# Patient Record
Sex: MALE | Race: WHITE | NOT HISPANIC OR LATINO | Employment: FULL TIME | URBAN - METROPOLITAN AREA
[De-identification: names, ages, dates, MRNs, and addresses within clinical notes are randomized per-mention and may not be internally consistent; named-entity substitution may affect disease eponyms.]

---

## 2023-04-19 ENCOUNTER — OFFICE VISIT (OUTPATIENT)
Dept: PRIMARY CARE | Facility: CLINIC | Age: 58
End: 2023-04-19
Payer: COMMERCIAL

## 2023-04-19 VITALS
HEART RATE: 66 BPM | WEIGHT: 213.8 LBS | BODY MASS INDEX: 30.61 KG/M2 | SYSTOLIC BLOOD PRESSURE: 140 MMHG | HEIGHT: 70 IN | DIASTOLIC BLOOD PRESSURE: 86 MMHG

## 2023-04-19 DIAGNOSIS — E78.00 ELEVATED LDL CHOLESTEROL LEVEL: ICD-10-CM

## 2023-04-19 DIAGNOSIS — Z12.11 COLON CANCER SCREENING: ICD-10-CM

## 2023-04-19 DIAGNOSIS — R73.03 PREDIABETES: ICD-10-CM

## 2023-04-19 DIAGNOSIS — R19.5 POSITIVE COLORECTAL CANCER SCREENING USING COLOGUARD TEST: ICD-10-CM

## 2023-04-19 DIAGNOSIS — R91.1 LUNG NODULE, SOLITARY: ICD-10-CM

## 2023-04-19 DIAGNOSIS — I10 PRIMARY HYPERTENSION: Primary | ICD-10-CM

## 2023-04-19 PROBLEM — M48.02 NEURAL FORAMINAL STENOSIS OF CERVICAL SPINE: Status: ACTIVE | Noted: 2023-04-19

## 2023-04-19 PROCEDURE — 99214 OFFICE O/P EST MOD 30 MIN: CPT | Performed by: STUDENT IN AN ORGANIZED HEALTH CARE EDUCATION/TRAINING PROGRAM

## 2023-04-19 PROCEDURE — 1036F TOBACCO NON-USER: CPT | Performed by: STUDENT IN AN ORGANIZED HEALTH CARE EDUCATION/TRAINING PROGRAM

## 2023-04-19 PROCEDURE — 3077F SYST BP >= 140 MM HG: CPT | Performed by: STUDENT IN AN ORGANIZED HEALTH CARE EDUCATION/TRAINING PROGRAM

## 2023-04-19 PROCEDURE — 3079F DIAST BP 80-89 MM HG: CPT | Performed by: STUDENT IN AN ORGANIZED HEALTH CARE EDUCATION/TRAINING PROGRAM

## 2023-04-19 RX ORDER — OLMESARTAN MEDOXOMIL 20 MG/1
20 TABLET ORAL DAILY
COMMUNITY
End: 2023-04-19 | Stop reason: ALTCHOICE

## 2023-04-19 RX ORDER — OLMESARTAN MEDOXOMIL 40 MG/1
40 TABLET ORAL DAILY
Qty: 90 TABLET | Refills: 1 | Status: SHIPPED | OUTPATIENT
Start: 2023-04-19 | End: 2023-11-14 | Stop reason: SDUPTHER

## 2023-04-19 ASSESSMENT — PATIENT HEALTH QUESTIONNAIRE - PHQ9
2. FEELING DOWN, DEPRESSED OR HOPELESS: NOT AT ALL
1. LITTLE INTEREST OR PLEASURE IN DOING THINGS: NOT AT ALL
SUM OF ALL RESPONSES TO PHQ9 QUESTIONS 1 AND 2: 0

## 2023-04-19 NOTE — PROGRESS NOTES
Subjective   Patient ID: Chuy Oconnell is a 57 y.o. male who presents for Hyperlipidemia and Hypertension.  Today he is accompanied by alone.     HPI  1. Hypertension  Currently on olmesartan 20 mg daily. Tolerating well.   BP today in triage showed 140/84. Recheck showed 144/82.  Denies any headaches, dizziness, vision changes, chest pain or pressure, palpitations, SOB, MENDOZA, LE edema, or any other complaints.  Willing to change medication if medically necessary  Continues to work out 6 times weekly and surprise of these values     2. Hyperlipidemia   Currently trying to eat a well balanced diet and exercises daily  Previous lipid panel on 10/24/22 showed HDL 38.5, VLDL 66 and   CT cardiac score was 0   Did admit that he has been taking over-the-counter fish oil  Agreeable to do blood work in the future and even wondered if it could be appropriate to do it during his physical    3. Lung nodule on CT  A follow up CT is due some time this spring  Patient claims he is going to schedule it after he comes back from a work trip at the end of May  Denies any issues at this time, and he feels that it has been a waste of time    4. Prediabetes  Last A1c was stable at 6.0 on 10/24/22  Denies any polyuria, polydipsia, polyphagia, vision changes or neuropathy      5. Health Maintenance/Positive colorectal screening via Cologuard  Patient is in need of a shingles vaccine   Patient was given a requisition for a colonoscopy last year after a positive cologuard   He was unable to have the procedure completed due to trouble with scheduling  Willing to schedule it for the near future, but feels they will simply deny him again         Current Outpatient Medications on File Prior to Visit   Medication Sig Dispense Refill    [DISCONTINUED] olmesartan (BENIcar) 20 mg tablet Take 1 tablet (20 mg) by mouth once daily.       No current facility-administered medications on file prior to visit.        No Known  "Allergies    Immunization History   Administered Date(s) Administered    Moderna SARS-CoV-2 Vaccination 12/06/2021, 04/21/2022    Pfizer Purple Cap SARS-CoV-2 04/05/2021, 04/26/2021         Review of Systems  All pertinent positive symptoms are included in the history of present illness.  All other systems have been reviewed and are negative and noncontributory to this patient's current ailments.     Objective   /86 (BP Location: Left arm, Patient Position: Sitting, BP Cuff Size: Adult)   Pulse 66   Ht 1.778 m (5' 10\")   Wt 97 kg (213 lb 12.8 oz)   BMI 30.68 kg/m²   BSA: 2.19 meters squared      Physical Exam  CONSTITUTIONAL - well nourished, well developed, looks like stated age, in no acute distress, not ill-appearing, and not tired appearing  SKIN - normal skin color and pigmentation, normal skin turgor without rash, lesions, or nodules visualized  HEAD - no trauma, normocephalic  EYES - pupils are equal and reactive to light, extraocular muscles are intact, and normal external exam  ENT - uvula midline, normal tongue movement and throat normal, no exudate, nasal passage without discharge and patent  NECK - supple without rigidity, no neck mass was observed, no thyromegaly or thyroid nodules  CHEST - clear to auscultation, no wheezing, no crackles and no rales, good effort  CARDIAC - regular rate and regular rhythm, no skipped beats, no murmur  ABDOMEN - no organomegaly, soft, nontender, nondistended, normal bowel sounds, no guarding/rebound/rigidity  EXTREMITIES - no edema, no deformities  NEUROLOGICAL - normal gait, normal balance, normal motor, no ataxia; alert, oriented and no focal signs  PSYCHIATRIC - alert, pleasant and cordial, age-appropriate  IMMUNOLOGIC - no cervical lymphadenopathy     Assessment/Plan   1. Hypertension  Currently on olmesartan 20 mg daily. Tolerating well.   Unfortunate blood pressure was elevated so we will increase now to 40 mg daily  This was sent to your local " pharmacy  I would like to have you monitor and record blood pressures at home  Blood pressure goal should be below 130/80, ideally 120/80     2. Hyperlipidemia   Previous lipid panel on 10/24/22 showed HDL 38.5, VLDL 66 and   CT cardiac score was 0   Continue taking over-the-counter fish oil  We will repeat blood work in 6 months    3. Lung nodule on CT  Continue to follow with his pulmonary clinic  Denies any issues at this time, and he feels that it has been a waste of time  The protocol suggests a follow-up CT chest in this spring  Please have this done and we will continue monitoring closely and I will take over in the future    4. Prediabetes  Last A1c was stable at 6.0 on 10/24/22  We will continue monitoring closely and please exercise regularly     5. Health Maintenance/Positive colorectal screening via Cologuard  We will discuss shingles vaccine in the fall  Otherwise, I will provide a colon cancer screening requisition via colonoscopy for [facility]    Please follow-up in 6 months for continued care

## 2023-06-20 ENCOUNTER — HOSPITAL ENCOUNTER (OUTPATIENT)
Dept: DATA CONVERSION | Facility: HOSPITAL | Age: 58
End: 2023-06-20
Attending: INTERNAL MEDICINE
Payer: COMMERCIAL

## 2023-06-20 DIAGNOSIS — I10 ESSENTIAL (PRIMARY) HYPERTENSION: ICD-10-CM

## 2023-06-20 DIAGNOSIS — D12.0 BENIGN NEOPLASM OF CECUM: ICD-10-CM

## 2023-06-20 DIAGNOSIS — Z12.11 ENCOUNTER FOR SCREENING FOR MALIGNANT NEOPLASM OF COLON: ICD-10-CM

## 2023-06-20 DIAGNOSIS — D12.4 BENIGN NEOPLASM OF DESCENDING COLON: ICD-10-CM

## 2023-06-27 LAB
COMPLETE PATHOLOGY REPORT: NORMAL
CONVERTED CLINICAL DIAGNOSIS-HISTORY: NORMAL
CONVERTED FINAL DIAGNOSIS: NORMAL
CONVERTED FINAL REPORT PDF LINK TO COPY AND PASTE: NORMAL
CONVERTED GROSS DESCRIPTION: NORMAL

## 2023-11-13 ASSESSMENT — ENCOUNTER SYMPTOMS
NECK PAIN: 0
BLURRED VISION: 0
HYPERTENSION: 1
SWEATS: 0
ORTHOPNEA: 0
PALPITATIONS: 0
HEADACHES: 0
SHORTNESS OF BREATH: 0
PND: 0

## 2023-11-14 ENCOUNTER — OFFICE VISIT (OUTPATIENT)
Dept: PRIMARY CARE | Facility: CLINIC | Age: 58
End: 2023-11-14
Payer: COMMERCIAL

## 2023-11-14 VITALS
BODY MASS INDEX: 29.98 KG/M2 | HEART RATE: 88 BPM | HEIGHT: 70 IN | SYSTOLIC BLOOD PRESSURE: 130 MMHG | DIASTOLIC BLOOD PRESSURE: 80 MMHG | WEIGHT: 209.4 LBS

## 2023-11-14 DIAGNOSIS — E78.00 ELEVATED LDL CHOLESTEROL LEVEL: ICD-10-CM

## 2023-11-14 DIAGNOSIS — R91.1 LUNG NODULE, SOLITARY: ICD-10-CM

## 2023-11-14 DIAGNOSIS — Z00.00 HEALTHCARE MAINTENANCE: Primary | ICD-10-CM

## 2023-11-14 DIAGNOSIS — R73.03 PREDIABETES: ICD-10-CM

## 2023-11-14 DIAGNOSIS — I10 PRIMARY HYPERTENSION: ICD-10-CM

## 2023-11-14 PROCEDURE — 3075F SYST BP GE 130 - 139MM HG: CPT | Performed by: STUDENT IN AN ORGANIZED HEALTH CARE EDUCATION/TRAINING PROGRAM

## 2023-11-14 PROCEDURE — 1036F TOBACCO NON-USER: CPT | Performed by: STUDENT IN AN ORGANIZED HEALTH CARE EDUCATION/TRAINING PROGRAM

## 2023-11-14 PROCEDURE — 3079F DIAST BP 80-89 MM HG: CPT | Performed by: STUDENT IN AN ORGANIZED HEALTH CARE EDUCATION/TRAINING PROGRAM

## 2023-11-14 PROCEDURE — 99213 OFFICE O/P EST LOW 20 MIN: CPT | Performed by: STUDENT IN AN ORGANIZED HEALTH CARE EDUCATION/TRAINING PROGRAM

## 2023-11-14 PROCEDURE — 99396 PREV VISIT EST AGE 40-64: CPT | Performed by: STUDENT IN AN ORGANIZED HEALTH CARE EDUCATION/TRAINING PROGRAM

## 2023-11-14 RX ORDER — OLMESARTAN MEDOXOMIL 40 MG/1
40 TABLET ORAL DAILY
Qty: 90 TABLET | Refills: 1 | Status: SHIPPED | OUTPATIENT
Start: 2023-11-14 | End: 2024-05-16

## 2023-11-14 ASSESSMENT — ENCOUNTER SYMPTOMS
HYPERTENSION: 1
BLURRED VISION: 0
PALPITATIONS: 0
HEADACHES: 0
SWEATS: 0
PND: 0
SHORTNESS OF BREATH: 0
ORTHOPNEA: 0
NECK PAIN: 0

## 2023-11-14 ASSESSMENT — PATIENT HEALTH QUESTIONNAIRE - PHQ9
1. LITTLE INTEREST OR PLEASURE IN DOING THINGS: NOT AT ALL
SUM OF ALL RESPONSES TO PHQ9 QUESTIONS 1 AND 2: 0
2. FEELING DOWN, DEPRESSED OR HOPELESS: NOT AT ALL

## 2023-11-14 NOTE — PROGRESS NOTES
"Subjective   Patient ID: Chuy Oconnell \"Florentin\" is a 58 y.o. male who presents for Annual Exam.  Today he is accompanied by alone.     Hypertension  The current episode started more than 1 year ago. The problem has been rapidly improving since onset. The problem is controlled. Pertinent negatives include no anxiety, blurred vision, chest pain, headaches, malaise/fatigue, neck pain, orthopnea, palpitations, peripheral edema, PND, shortness of breath or sweats. There are no associated agents to hypertension. There are no known risk factors for coronary artery disease. There are no compliance problems.      1. Health Maintenance  Overall patient is doing well.   Immunization: Tdap up-to-date 2021, inquiring about influenza and shingles but declines at this time  COVID-19 vaccine series completed (Pfizer).  Colon Cancer Screening: Positive Cologuard and performed colonoscopy this summer, 2023, with a 5-year clearance, due 2028  Diet: patient is on a specific diet plan and continues to do well  Exercise: 5-6 days a week. Involves weights, cycling and rowing machine.   Tobacco: Denies use  EtOH: 1-2 glasses of red wine a day    2. Hypertension  Currently on olmesartan 40 mg daily. Tolerating well.   Feels well timely major issues/complaints  Blood pressure well within normal limits but did note some low values and even decreased down to 20 mg  Currently back up to 40 mg  Denies any cardiopulmonary symptoms requesting refills     3. Hyperlipidemia   Currently trying to eat a well balanced diet and exercises daily  Previous lipid panel on 10/24/22 showed HDL 38.5, VLDL 66 and   CT cardiac score was 0   Agreeable to do lab work in the near future    4. Lung nodule on CT  A follow up CT was due some time this spring  States that he will debate to do this again and wish to discuss this briefly     5. Prediabetes  Last A1c was stable at 6.0 on 10/24/22  Denies any polyuria, polydipsia, polyphagia, vision changes or " "neuropathy       Current Outpatient Medications on File Prior to Visit   Medication Sig Dispense Refill    olmesartan (BENIcar) 40 mg tablet Take 1 tablet (40 mg) by mouth once daily. 90 tablet 1     No current facility-administered medications on file prior to visit.        No Known Allergies    Immunization History   Administered Date(s) Administered    Moderna SARS-CoV-2 Vaccination 12/06/2021, 04/21/2022    Pfizer Purple Cap SARS-CoV-2 04/05/2021, 04/26/2021    Tdap vaccine, age 7 year and older (BOOSTRIX) 09/24/2021         Review of Systems   Constitutional:  Negative for malaise/fatigue.   Eyes:  Negative for blurred vision.   Respiratory:  Negative for shortness of breath.    Cardiovascular:  Negative for chest pain, palpitations, orthopnea and PND.   Musculoskeletal:  Negative for neck pain.   Neurological:  Negative for headaches.     All pertinent positive symptoms are included in the history of present illness.  All other systems have been reviewed and are negative and noncontributory to this patient's current ailments.     Objective   /80 (BP Location: Left arm, Patient Position: Sitting, BP Cuff Size: Adult)   Pulse 88   Ht 1.778 m (5' 10\")   Wt 95 kg (209 lb 6.4 oz)   BMI 30.05 kg/m²   BSA: 2.17 meters squared  No visits with results within 1 Month(s) from this visit.   Latest known visit with results is:   Legacy Encounter on 10/24/2022   Component Date Value Ref Range Status    Glucose 10/24/2022 94  74 - 99 mg/dL Final    Sodium 10/24/2022 136  136 - 145 mmol/L Final    Potassium 10/24/2022 4.2  3.5 - 5.3 mmol/L Final    Chloride 10/24/2022 101  98 - 107 mmol/L Final    Bicarbonate 10/24/2022 24  21 - 32 mmol/L Final    Anion Gap 10/24/2022 15  10 - 20 mmol/L Final    Urea Nitrogen 10/24/2022 18  6 - 23 mg/dL Final    Creatinine 10/24/2022 1.01  0.50 - 1.30 mg/dL Final    GFR MALE 10/24/2022 86  >90 mL/min/1.73m2 Final    Comment:  CALCULATIONS OF ESTIMATED GFR ARE PERFORMED   USING THE " 2021 CKD-EPI STUDY REFIT EQUATION   WITHOUT THE RACE VARIABLE FOR THE IDMS-TRACEABLE   CREATININE METHODS.    https://jasn.asnjournals.org/content/early/2021/09/22/ASN.6429217380      Calcium 10/24/2022 8.6  8.6 - 10.3 mg/dL Final    Albumin 10/24/2022 4.2  3.4 - 5.0 g/dL Final    Alkaline Phosphatase 10/24/2022 50  33 - 120 U/L Final    Total Protein 10/24/2022 6.9  6.4 - 8.2 g/dL Final    AST 10/24/2022 25  9 - 39 U/L Final    Total Bilirubin 10/24/2022 0.6  0.0 - 1.2 mg/dL Final    ALT (SGPT) 10/24/2022 25  10 - 52 U/L Final    Comment:  Patients treated with Sulfasalazine may generate    falsely decreased results for ALT.      Hepatitis C Ab 10/24/2022 NONREACTIVE  NONREACTIVE Final    Comment:  Results from patients taking biotin supplements or receiving   high-dose biotin therapy should be interpreted with caution   due to possible interference with this test. Providers may    contact their local laboratory for further information.      TSH 10/24/2022 2.31  0.44 - 3.98 mIU/L Final    Comment:  TSH testing is performed using different testing    methodology at [facility] than at other    [facility]. Direct result comparisons should    only be made within the same method.      Hemoglobin A1C 10/24/2022 6.0 (A)  % Final    Comment:      Diagnosis of Diabetes-Adults   Non-Diabetic: < or = 5.6%   Increased risk for developing diabetes: 5.7-6.4%   Diagnostic of diabetes: > or = 6.5%  .       Monitoring of Diabetes                Age (y)     Therapeutic Goal (%)   Adults:          >18           <7.0   Pediatrics:    13-18           <7.5                   7-12           <8.0                   0- 6            7.5-8.5   American Diabetes Association. Diabetes Care 33(S1), Jan 2010.      Estimated Average Glucose 10/24/2022 126  MG/DL Final    PSA 10/24/2022 0.58  0.00 - 4.00 ng/mL Final    Comment: The FDA requires that the method used for PSA assay be   reported to the physician. Values obtained  with different   assay methods must not be used interchangeably. This test  was performed at [facility] using the Access   Hybritech PSA assay is a two-site immunoenzymatic sandwich   assay. The assay is approved for measurement of   prostate-specific antigen (PSA)in serum and may be used   in conjunction with a digital rectal examination in men   50 years and older as an aid in detection of prostate   cancer.  5-Alpha-reductase inhibitors (e.g. Proscar, Finasteride,   Avodart, Dutasteride and Shima) for the treatment of BPH   have been shown to lower PSA levels by an average of 50%   after 6 months of treatment.      Cholesterol 10/24/2022 175  0 - 199 mg/dL Final    Comment: .      AGE      DESIRABLE   BORDERLINE HIGH   HIGH     0-19 Y     0 - 169       170 - 199     >/= 200    20-24 Y     0 - 189       190 - 224     >/= 225         >24 Y     0 - 199       200 - 239     >/= 240   **All ranges are based on fasting samples. Specific   therapeutic targets will vary based on patient-specific   cardiac risk.  .   Pediatric guidelines reference:Pediatrics 2011, 128(S5).   Adult guidelines reference: NCEP ATPIII Guidelines,     PATTY 2001, 258:2486-97  .   Venipuncture immediately after or during the    administration of Metamizole may lead to falsely   low results. Testing should be performed immediately   prior to Metamizole dosing.      HDL 10/24/2022 38.5 (A)  mg/dL Final    Comment: .      AGE      VERY LOW   LOW     NORMAL    HIGH       0-19 Y       < 35   < 40     40-45     ----    20-24 Y       ----   < 40       >45     ----      >24 Y       ----   < 40     40-60      >60  .      Cholesterol/HDL Ratio 10/24/2022 4.5   Final    Comment: REF VALUES  DESIRABLE  < 3.4  HIGH RISK  > 5.0      LDL 10/24/2022 71  0 - 99 mg/dL Final    Comment: .                           NEAR      BORD      AGE      DESIRABLE  OPTIMAL    HIGH     HIGH     VERY HIGH     0-19 Y     0 - 109     ---    110-129   >/= 130      ----    20-24 Y     0 - 119     ---    120-159   >/= 160     ----      >24 Y     0 -  99   100-129  130-159   160-189     >/=190  .      VLDL 10/24/2022 66 (H)  0 - 40 mg/dL Final    Triglycerides 10/24/2022 329 (H)  0 - 149 mg/dL Final    Comment: .      AGE      DESIRABLE   BORDERLINE HIGH   HIGH     VERY HIGH   0 D-90 D    19 - 174         ----         ----        ----  91 D- 9 Y     0 -  74        75 -  99     >/= 100      ----    10-19 Y     0 -  89        90 - 129     >/= 130      ----    20-24 Y     0 - 114       115 - 149     >/= 150      ----         >24 Y     0 - 149       150 - 199    200- 499    >/= 500  .   Venipuncture immediately after or during the    administration of Metamizole may lead to falsely   low results. Testing should be performed immediately   prior to Metamizole dosing.      Non HDL Cholesterol 10/24/2022 137  mg/dL Final    Comment:     AGE      DESIRABLE   BORDERLINE HIGH   HIGH     VERY HIGH     0-19 Y     0 - 119       120 - 144     >/= 145    >/= 160    20-24 Y     0 - 149       150 - 189     >/= 190      ----         >24 Y    30 MG/DL ABOVE LDL CHOLESTEROL GOAL  .      WBC 10/24/2022 7.5  4.4 - 11.3 x10E9/L Final    RBC 10/24/2022 5.12  4.50 - 5.90 x10E12/L Final    Hemoglobin 10/24/2022 14.9  13.5 - 17.5 g/dL Final    Hematocrit 10/24/2022 45.7  41.0 - 52.0 % Final    MCV 10/24/2022 89  80 - 100 fL Final    MCHC 10/24/2022 32.6  32.0 - 36.0 g/dL Final    Platelets 10/24/2022 293  150 - 450 x10E9/L Final    RDW 10/24/2022 13.2  11.5 - 14.5 % Final    Neutrophils % 10/24/2022 47.8  40.0 - 80.0 % Final    Immature Granulocytes %, Automated 10/24/2022 0.3  0.0 - 0.9 % Final    Comment:  Immature Granulocyte Count (IG) includes promyelocytes,    myelocytes and metamyelocytes but does not include bands.   Percent differential counts (%) should be interpreted in the   context of the absolute cell counts (cells/L).      Lymphocytes % 10/24/2022 41.4  13.0 - 44.0 % Final    Monocytes %  10/24/2022 8.0  2.0 - 10.0 % Final    Eosinophils % 10/24/2022 2.1  0.0 - 6.0 % Final    Basophils % 10/24/2022 0.4  0.0 - 2.0 % Final    Neutrophils Absolute 10/24/2022 3.58  1.20 - 7.70 x10E9/L Final    Lymphocytes Absolute 10/24/2022 3.10  1.20 - 4.80 x10E9/L Final    Monocytes Absolute 10/24/2022 0.60  0.10 - 1.00 x10E9/L Final    Eosinophils Absolute 10/24/2022 0.16  0.00 - 0.70 x10E9/L Final    Basophils Absolute 10/24/2022 0.03  0.00 - 0.10 x10E9/L Final       Physical Exam  CONSTITUTIONAL - well nourished, well developed, looks like stated age, in no acute distress, not ill-appearing, and not tired appearing  SKIN - normal skin color and pigmentation, normal skin turgor without rash, lesions, or nodules visualized  HEAD - no trauma, normocephalic  EYES - normal external exam  ENT - TM's intact, no injection, no signs of infection, uvula midline, normal tongue movement and throat normal, no exudate  NECK - supple without rigidity, no neck mass was observed, no thyromegaly or thyroid nodules  CHEST - clear to auscultation, no wheezing, no crackles and no rales, good effort  CARDIAC - regular rate and regular rhythm, no skipped beats, no murmur  ABDOMEN - no organomegaly, soft, nontender, nondistended, normal bowel sounds, no guarding/rebound/rigidity, negative McBurney sign and negative Estrada sign  EXTREMITIES - no edema, no deformities  NEUROLOGICAL - normal gait, normal balance, normal motor, no ataxia  PSYCHIATRIC - alert, pleasant and cordial, age-appropriate  IMMUNOLOGIC - no cervical lymphadenopathy     Assessment/Plan   1.  Healthcare maintenance  Complete history and physical examination was performed  We will notify of test results once available and make treatment recommendations accordingly  Colon cancer screening due 2028    2. Hypertension  Stable.  No changes recommended at this time  We will continue olmesartan 40 mg daily  I would like to have you monitor and record blood pressures at  home  Blood pressure goal should be below 130/80, ideally 120/80     3. Hyperlipidemia   Previous lipid panel on 10/24/22 showed HDL 38.5, VLDL 66 and   CT cardiac score was 0   Continue taking over-the-counter fish oil  Lab work ordered and will notify the results and make reactors accordingly     4. Lung nodule on CT  Please repeat the CT chest and ultimately I will continue following up after this time     5. Prediabetes  Last A1c was stable at 6.0 on 10/24/22  We will continue monitoring closely and please exercise regularly

## 2023-11-16 ENCOUNTER — LAB (OUTPATIENT)
Dept: LAB | Facility: LAB | Age: 58
End: 2023-11-16
Payer: COMMERCIAL

## 2023-11-16 DIAGNOSIS — Z00.00 HEALTHCARE MAINTENANCE: ICD-10-CM

## 2023-11-16 LAB
ALBUMIN SERPL BCP-MCNC: 4.3 G/DL (ref 3.4–5)
ALP SERPL-CCNC: 45 U/L (ref 33–120)
ALT SERPL W P-5'-P-CCNC: 25 U/L (ref 10–52)
ANION GAP SERPL CALC-SCNC: 11 MMOL/L (ref 10–20)
AST SERPL W P-5'-P-CCNC: 21 U/L (ref 9–39)
BASOPHILS # BLD AUTO: 0.03 X10*3/UL (ref 0–0.1)
BASOPHILS NFR BLD AUTO: 0.4 %
BILIRUB SERPL-MCNC: 0.5 MG/DL (ref 0–1.2)
BUN SERPL-MCNC: 22 MG/DL (ref 6–23)
CALCIUM SERPL-MCNC: 9.1 MG/DL (ref 8.6–10.3)
CHLORIDE SERPL-SCNC: 103 MMOL/L (ref 98–107)
CHOLEST SERPL-MCNC: 199 MG/DL (ref 0–199)
CHOLESTEROL/HDL RATIO: 3.9
CO2 SERPL-SCNC: 28 MMOL/L (ref 21–32)
CREAT SERPL-MCNC: 1.06 MG/DL (ref 0.5–1.3)
EOSINOPHIL # BLD AUTO: 0.11 X10*3/UL (ref 0–0.7)
EOSINOPHIL NFR BLD AUTO: 1.6 %
ERYTHROCYTE [DISTWIDTH] IN BLOOD BY AUTOMATED COUNT: 13.9 % (ref 11.5–14.5)
EST. AVERAGE GLUCOSE BLD GHB EST-MCNC: 128 MG/DL
GFR SERPL CREATININE-BSD FRML MDRD: 81 ML/MIN/1.73M*2
GLUCOSE SERPL-MCNC: 100 MG/DL (ref 74–99)
HBA1C MFR BLD: 6.1 %
HCT VFR BLD AUTO: 46.5 % (ref 41–52)
HDLC SERPL-MCNC: 50.4 MG/DL
HGB BLD-MCNC: 14.7 G/DL (ref 13.5–17.5)
IMM GRANULOCYTES # BLD AUTO: 0.01 X10*3/UL (ref 0–0.7)
IMM GRANULOCYTES NFR BLD AUTO: 0.1 % (ref 0–0.9)
LDLC SERPL CALC-MCNC: 134 MG/DL
LYMPHOCYTES # BLD AUTO: 2.73 X10*3/UL (ref 1.2–4.8)
LYMPHOCYTES NFR BLD AUTO: 40.3 %
MCH RBC QN AUTO: 28.7 PG (ref 26–34)
MCHC RBC AUTO-ENTMCNC: 31.6 G/DL (ref 32–36)
MCV RBC AUTO: 91 FL (ref 80–100)
MONOCYTES # BLD AUTO: 0.49 X10*3/UL (ref 0.1–1)
MONOCYTES NFR BLD AUTO: 7.2 %
NEUTROPHILS # BLD AUTO: 3.4 X10*3/UL (ref 1.2–7.7)
NEUTROPHILS NFR BLD AUTO: 50.4 %
NON HDL CHOLESTEROL: 149 MG/DL (ref 0–149)
NRBC BLD-RTO: 0 /100 WBCS (ref 0–0)
PLATELET # BLD AUTO: 295 X10*3/UL (ref 150–450)
POTASSIUM SERPL-SCNC: 4.5 MMOL/L (ref 3.5–5.3)
PROT SERPL-MCNC: 7 G/DL (ref 6.4–8.2)
PSA SERPL-MCNC: 0.73 NG/ML
RBC # BLD AUTO: 5.12 X10*6/UL (ref 4.5–5.9)
SODIUM SERPL-SCNC: 137 MMOL/L (ref 136–145)
TRIGL SERPL-MCNC: 73 MG/DL (ref 0–149)
TSH SERPL-ACNC: 0.94 MIU/L (ref 0.44–3.98)
VLDL: 15 MG/DL (ref 0–40)
WBC # BLD AUTO: 6.8 X10*3/UL (ref 4.4–11.3)

## 2023-11-16 PROCEDURE — 80053 COMPREHEN METABOLIC PANEL: CPT

## 2023-11-16 PROCEDURE — 80061 LIPID PANEL: CPT

## 2023-11-16 PROCEDURE — 85025 COMPLETE CBC W/AUTO DIFF WBC: CPT

## 2023-11-16 PROCEDURE — 84443 ASSAY THYROID STIM HORMONE: CPT

## 2023-11-16 PROCEDURE — 83036 HEMOGLOBIN GLYCOSYLATED A1C: CPT

## 2023-11-16 PROCEDURE — 36415 COLL VENOUS BLD VENIPUNCTURE: CPT

## 2023-11-16 PROCEDURE — 84153 ASSAY OF PSA TOTAL: CPT

## 2023-11-17 NOTE — RESULT ENCOUNTER NOTE
Hemoglobin A1c continues to show prediabetes at 6.1% slightly elevated from last year's value at 6.0%  I would recommend we continue monitoring closely    Cholesterol looks okay but did increase slightly to 199, HDL 50, , triglycerides 73    Sugar one-point above normal 1 fasting but otherwise liver, kidneys, electrolytes are all within normal limits    Complete blood cell count shows no anemia    Thyroid and prostate within normal limits

## 2024-05-16 DIAGNOSIS — I10 PRIMARY HYPERTENSION: ICD-10-CM

## 2024-05-16 RX ORDER — OLMESARTAN MEDOXOMIL 40 MG/1
40 TABLET ORAL DAILY
Qty: 30 TABLET | Refills: 0 | Status: SHIPPED | OUTPATIENT
Start: 2024-05-16

## 2024-05-23 ENCOUNTER — APPOINTMENT (OUTPATIENT)
Dept: PRIMARY CARE | Facility: CLINIC | Age: 59
End: 2024-05-23
Payer: COMMERCIAL

## 2024-06-16 DIAGNOSIS — I10 PRIMARY HYPERTENSION: ICD-10-CM

## 2024-06-17 RX ORDER — OLMESARTAN MEDOXOMIL 40 MG/1
40 TABLET ORAL DAILY
Qty: 30 TABLET | Refills: 0 | Status: SHIPPED | OUTPATIENT
Start: 2024-06-17

## 2024-06-28 ENCOUNTER — APPOINTMENT (OUTPATIENT)
Dept: PRIMARY CARE | Facility: CLINIC | Age: 59
End: 2024-06-28
Payer: COMMERCIAL

## 2024-06-28 VITALS
HEIGHT: 70 IN | HEART RATE: 70 BPM | SYSTOLIC BLOOD PRESSURE: 130 MMHG | BODY MASS INDEX: 29.92 KG/M2 | DIASTOLIC BLOOD PRESSURE: 80 MMHG | WEIGHT: 209 LBS

## 2024-06-28 DIAGNOSIS — R91.1 LUNG NODULE, SOLITARY: Primary | ICD-10-CM

## 2024-06-28 DIAGNOSIS — R73.03 PREDIABETES: ICD-10-CM

## 2024-06-28 DIAGNOSIS — I10 PRIMARY HYPERTENSION: ICD-10-CM

## 2024-06-28 DIAGNOSIS — E78.00 ELEVATED LDL CHOLESTEROL LEVEL: ICD-10-CM

## 2024-06-28 PROCEDURE — 1036F TOBACCO NON-USER: CPT | Performed by: STUDENT IN AN ORGANIZED HEALTH CARE EDUCATION/TRAINING PROGRAM

## 2024-06-28 PROCEDURE — 3079F DIAST BP 80-89 MM HG: CPT | Performed by: STUDENT IN AN ORGANIZED HEALTH CARE EDUCATION/TRAINING PROGRAM

## 2024-06-28 PROCEDURE — 3075F SYST BP GE 130 - 139MM HG: CPT | Performed by: STUDENT IN AN ORGANIZED HEALTH CARE EDUCATION/TRAINING PROGRAM

## 2024-06-28 PROCEDURE — 99214 OFFICE O/P EST MOD 30 MIN: CPT | Performed by: STUDENT IN AN ORGANIZED HEALTH CARE EDUCATION/TRAINING PROGRAM

## 2024-06-28 RX ORDER — OLMESARTAN MEDOXOMIL 40 MG/1
40 TABLET ORAL DAILY
Qty: 90 TABLET | Refills: 1 | Status: SHIPPED | OUTPATIENT
Start: 2024-06-28

## 2024-06-28 ASSESSMENT — ENCOUNTER SYMPTOMS
HYPERTENSION: 1
NECK PAIN: 0
HEADACHES: 0
PALPITATIONS: 0
SHORTNESS OF BREATH: 0

## 2024-06-28 NOTE — PROGRESS NOTES
"Subjective   Patient ID: Chuy Oconnell \"Lisandro" is a 58 y.o. male who presents for Hypertension.  Today he is accompanied by alone.       1.  Hypertension  Currently on olmesartan 40 mg daily.  Continues to feel well without any issues/complaints  In the past did note lower values and we decreased down to 20 mg but currently he is back up to 40 mg  Denies any cardiopulmonary symptoms requesting refills     2.  Hyperlipidemia   Currently trying to eat a well balanced diet and exercises daily  Previous lipid panel showed a total cholesterol 199, HDL 50, , triglycerides 73  CT cardiac score was 0   Continues to do blood work on a yearly basis    3.  Lung nodule on CT  A follow up CT was due last year, 2023  States that he will debate to do this again and wish to discuss this briefly     4.  Prediabetes  Last A1c was stable at 6.0 on 10/24/22 and then just last year was at 6.1% in November 2023  Denies any polyuria, polydipsia, polyphagia, vision changes or neuropathy       Current Outpatient Medications on File Prior to Visit   Medication Sig Dispense Refill    [DISCONTINUED] olmesartan (BENIcar) 40 mg tablet TAKE 1 TABLET BY MOUTH EVERY DAY 30 tablet 0     No current facility-administered medications on file prior to visit.        No Known Allergies    Immunization History   Administered Date(s) Administered    Moderna SARS-CoV-2 Vaccination 12/06/2021, 04/21/2022    Pfizer Purple Cap SARS-CoV-2 04/05/2021, 04/26/2021    Tdap vaccine, age 7 year and older (BOOSTRIX, ADACEL) 09/24/2021         Review of Systems   Respiratory:  Negative for shortness of breath.    Cardiovascular:  Negative for chest pain and palpitations.   Musculoskeletal:  Negative for neck pain.   Neurological:  Negative for headaches.     All pertinent positive symptoms are included in the history of present illness.  All other systems have been reviewed and are negative and noncontributory to this patient's current ailments.     Objective " "  /80   Pulse 70   Ht 1.778 m (5' 10\")   Wt 94.8 kg (209 lb)   BMI 29.99 kg/m²   BSA: 2.16 meters squared  No visits with results within 1 Month(s) from this visit.   Latest known visit with results is:   Lab on 11/16/2023   Component Date Value Ref Range Status    Prostate Specific AG 11/16/2023 0.73  <=4.00 ng/mL Final    Thyroid Stimulating Hormone 11/16/2023 0.94  0.44 - 3.98 mIU/L Final    Cholesterol 11/16/2023 199  0 - 199 mg/dL Final          Age      Desirable   Borderline High   High     0-19 Y     0 - 169       170 - 199     >/= 200    20-24 Y     0 - 189       190 - 224     >/= 225         >24 Y     0 - 199       200 - 239     >/= 240   **All ranges are based on fasting samples. Specific   therapeutic targets will vary based on patient-specific   cardiac risk.    Pediatric guidelines reference:Pediatrics 2011, 128(S5).Adult guidelines reference: NCEP ATPIII Guidelines,PATTY 2001, 258:2486-97    Venipuncture immediately after or during the administration of Metamizole may lead to falsely low results. Testing should be performed immediately prior to Metamizole dosing.    HDL-Cholesterol 11/16/2023 50.4  mg/dL Final      Age       Very Low   Low     Normal    High    0-19 Y    < 35      < 40     40-45     ----  20-24 Y    ----     < 40      >45      ----        >24 Y      ----     < 40     40-60      >60      Cholesterol/HDL Ratio 11/16/2023 3.9   Final      Ref Values  Desirable  < 3.4  High Risk  > 5.0    LDL Calculated 11/16/2023 134 (H)  <=99 mg/dL Final                                Near   Borderline      AGE      Desirable  Optimal    High     High     Very High     0-19 Y     0 - 109     ---    110-129   >/= 130     ----    20-24 Y     0 - 119     ---    120-159   >/= 160     ----      >24 Y     0 -  99   100-129  130-159   160-189     >/=190      VLDL 11/16/2023 15  0 - 40 mg/dL Final    Triglycerides 11/16/2023 73  0 - 149 mg/dL Final       Age         Desirable   Borderline High   High   "   Very High   0 D-90 D    19 - 174         ----         ----        ----  91 D- 9 Y     0 -  74        75 -  99     >/= 100      ----    10-19 Y     0 -  89        90 - 129     >/= 130      ----    20-24 Y     0 - 114       115 - 149     >/= 150      ----         >24 Y     0 - 149       150 - 199    200- 499    >/= 500    Venipuncture immediately after or during the administration of Metamizole may lead to falsely low results. Testing should be performed immediately prior to Metamizole dosing.    Non HDL Cholesterol 11/16/2023 149  0 - 149 mg/dL Final          Age       Desirable   Borderline High   High     Very High     0-19 Y     0 - 119       120 - 144     >/= 145    >/= 160    20-24 Y     0 - 149       150 - 189     >/= 190      ----         >24 Y    30 mg/dL above LDL Cholesterol goal      Glucose 11/16/2023 100 (H)  74 - 99 mg/dL Final    Sodium 11/16/2023 137  136 - 145 mmol/L Final    Potassium 11/16/2023 4.5  3.5 - 5.3 mmol/L Final    Chloride 11/16/2023 103  98 - 107 mmol/L Final    Bicarbonate 11/16/2023 28  21 - 32 mmol/L Final    Anion Gap 11/16/2023 11  10 - 20 mmol/L Final    Urea Nitrogen 11/16/2023 22  6 - 23 mg/dL Final    Creatinine 11/16/2023 1.06  0.50 - 1.30 mg/dL Final    eGFR 11/16/2023 81  >60 mL/min/1.73m*2 Final    Calculations of estimated GFR are performed using the 2021 CKD-EPI Study Refit equation without the race variable for the IDMS-Traceable creatinine methods.  https://jasn.asnjournals.org/content/early/2021/09/22/ASN.3881935174    Calcium 11/16/2023 9.1  8.6 - 10.3 mg/dL Final    Albumin 11/16/2023 4.3  3.4 - 5.0 g/dL Final    Alkaline Phosphatase 11/16/2023 45  33 - 120 U/L Final    Total Protein 11/16/2023 7.0  6.4 - 8.2 g/dL Final    AST 11/16/2023 21  9 - 39 U/L Final    Bilirubin, Total 11/16/2023 0.5  0.0 - 1.2 mg/dL Final    ALT 11/16/2023 25  10 - 52 U/L Final    Patients treated with Sulfasalazine may generate falsely decreased results for ALT.    WBC 11/16/2023 6.8   4.4 - 11.3 x10*3/uL Final    nRBC 11/16/2023 0.0  0.0 - 0.0 /100 WBCs Final    RBC 11/16/2023 5.12  4.50 - 5.90 x10*6/uL Final    Hemoglobin 11/16/2023 14.7  13.5 - 17.5 g/dL Final    Hematocrit 11/16/2023 46.5  41.0 - 52.0 % Final    MCV 11/16/2023 91  80 - 100 fL Final    MCH 11/16/2023 28.7  26.0 - 34.0 pg Final    MCHC 11/16/2023 31.6 (L)  32.0 - 36.0 g/dL Final    RDW 11/16/2023 13.9  11.5 - 14.5 % Final    Platelets 11/16/2023 295  150 - 450 x10*3/uL Final    Neutrophils % 11/16/2023 50.4  40.0 - 80.0 % Final    Immature Granulocytes %, Automated 11/16/2023 0.1  0.0 - 0.9 % Final    Immature Granulocyte Count (IG) includes promyelocytes, myelocytes and metamyelocytes but does not include bands. Percent differential counts (%) should be interpreted in the context of the absolute cell counts (cells/UL).    Lymphocytes % 11/16/2023 40.3  13.0 - 44.0 % Final    Monocytes % 11/16/2023 7.2  2.0 - 10.0 % Final    Eosinophils % 11/16/2023 1.6  0.0 - 6.0 % Final    Basophils % 11/16/2023 0.4  0.0 - 2.0 % Final    Neutrophils Absolute 11/16/2023 3.40  1.20 - 7.70 x10*3/uL Final    Percent differential counts (%) should be interpreted in the context of the absolute cell counts (cells/uL).    Immature Granulocytes Absolute, Au* 11/16/2023 0.01  0.00 - 0.70 x10*3/uL Final    Lymphocytes Absolute 11/16/2023 2.73  1.20 - 4.80 x10*3/uL Final    Monocytes Absolute 11/16/2023 0.49  0.10 - 1.00 x10*3/uL Final    Eosinophils Absolute 11/16/2023 0.11  0.00 - 0.70 x10*3/uL Final    Basophils Absolute 11/16/2023 0.03  0.00 - 0.10 x10*3/uL Final    Hemoglobin A1C 11/16/2023 6.1 (H)  see below % Final    Estimated Average Glucose 11/16/2023 128  Not Established mg/dL Final       Physical Exam  CONSTITUTIONAL - well nourished, well developed, looks like stated age, in no acute distress, not ill-appearing, and not tired appearing  SKIN - normal skin color and pigmentation, normal skin turgor without rash, lesions, or nodules  visualized  HEAD - no trauma, normocephalic  EYES - normal external exam  CHEST -no distressed breathing, good effort  EXTREMITIES - no edema, no deformities  NEUROLOGICAL - normal balance, normal motor, no ataxia  PSYCHIATRIC - alert, pleasant and cordial, age-appropriate    Assessment/Plan   1.  Hypertension  Stable.  No changes recommended at this time  We will continue olmesartan 40 mg daily  I would like to have you monitor and record blood pressures at home  Blood pressure goal should be below 130/80, ideally 120/80     2.  Hyperlipidemia   Previous cholesterol panel did show a slightly elevated LDL as discussed at today's appointment  CT cardiac score was 0   Continue taking over-the-counter fish oil  Will continue monitoring values at your physical     3.  Lung nodule on CT  Please repeat the CT chest  I ordered a requisition within the chart  In the future we most likely do not have to continue following     4.  Prediabetes  Last A1c was noted to be stable at 6.1%  We will continue monitoring closely and please exercise regularly  Will repeat this at your yearly physical examination    Please follow-up in December of this year to perform your yearly physical examination as well as to discuss other health topics/needs

## 2024-07-15 ENCOUNTER — HOSPITAL ENCOUNTER (OUTPATIENT)
Dept: RADIOLOGY | Facility: CLINIC | Age: 59
Discharge: HOME | End: 2024-07-15
Payer: COMMERCIAL

## 2024-07-15 DIAGNOSIS — R91.1 LUNG NODULE, SOLITARY: ICD-10-CM

## 2024-07-15 PROCEDURE — 71250 CT THORAX DX C-: CPT

## 2024-07-15 PROCEDURE — 71250 CT THORAX DX C-: CPT | Performed by: RADIOLOGY

## 2024-07-16 NOTE — RESULT ENCOUNTER NOTE
CT chest notes stable scattered pulmonary nodules measuring up to 6 mm    There is no new nodules or masses    Will discuss monitoring this every 2-5 years    Ascending aortic aneurysm continues to be stable at 4.0 cm and is unchanged

## 2025-01-08 DIAGNOSIS — I10 PRIMARY HYPERTENSION: ICD-10-CM

## 2025-01-08 RX ORDER — OLMESARTAN MEDOXOMIL 40 MG/1
40 TABLET ORAL DAILY
Qty: 30 TABLET | Refills: 0 | Status: SHIPPED | OUTPATIENT
Start: 2025-01-08

## 2025-01-31 DIAGNOSIS — I10 PRIMARY HYPERTENSION: ICD-10-CM

## 2025-01-31 RX ORDER — OLMESARTAN MEDOXOMIL 40 MG/1
40 TABLET ORAL DAILY
Qty: 15 TABLET | Refills: 0 | Status: SHIPPED | OUTPATIENT
Start: 2025-01-31

## 2025-02-10 ASSESSMENT — PROMIS GLOBAL HEALTH SCALE
CARRYOUT_PHYSICAL_ACTIVITIES: COMPLETELY
RATE_AVERAGE_PAIN: 0
RATE_PHYSICAL_HEALTH: EXCELLENT
RATE_GENERAL_HEALTH: EXCELLENT
RATE_QUALITY_OF_LIFE: EXCELLENT
CARRYOUT_SOCIAL_ACTIVITIES: EXCELLENT
EMOTIONAL_PROBLEMS: NEVER
RATE_SOCIAL_SATISFACTION: EXCELLENT
RATE_MENTAL_HEALTH: EXCELLENT

## 2025-02-11 ENCOUNTER — APPOINTMENT (OUTPATIENT)
Dept: PRIMARY CARE | Facility: CLINIC | Age: 60
End: 2025-02-11
Payer: COMMERCIAL

## 2025-02-11 VITALS
DIASTOLIC BLOOD PRESSURE: 80 MMHG | HEIGHT: 70 IN | WEIGHT: 214 LBS | OXYGEN SATURATION: 97 % | BODY MASS INDEX: 30.64 KG/M2 | SYSTOLIC BLOOD PRESSURE: 124 MMHG | HEART RATE: 86 BPM

## 2025-02-11 DIAGNOSIS — Z00.00 HEALTHCARE MAINTENANCE: Primary | ICD-10-CM

## 2025-02-11 DIAGNOSIS — R91.1 LUNG NODULE, SOLITARY: ICD-10-CM

## 2025-02-11 DIAGNOSIS — E78.00 ELEVATED LDL CHOLESTEROL LEVEL: ICD-10-CM

## 2025-02-11 DIAGNOSIS — R73.03 PREDIABETES: ICD-10-CM

## 2025-02-11 DIAGNOSIS — I10 PRIMARY HYPERTENSION: ICD-10-CM

## 2025-02-11 LAB
POC APPEARANCE, URINE: CLEAR
POC BILIRUBIN, URINE: NEGATIVE
POC BLOOD, URINE: NEGATIVE
POC COLOR, URINE: YELLOW
POC GLUCOSE, URINE: NEGATIVE MG/DL
POC KETONES, URINE: NEGATIVE MG/DL
POC LEUKOCYTES, URINE: NEGATIVE
POC NITRITE,URINE: NEGATIVE
POC PH, URINE: 6 PH
POC PROTEIN, URINE: NEGATIVE MG/DL
POC SPECIFIC GRAVITY, URINE: 1.01
POC UROBILINOGEN, URINE: 0.2 EU/DL

## 2025-02-11 PROCEDURE — 3079F DIAST BP 80-89 MM HG: CPT | Performed by: STUDENT IN AN ORGANIZED HEALTH CARE EDUCATION/TRAINING PROGRAM

## 2025-02-11 PROCEDURE — 1036F TOBACCO NON-USER: CPT | Performed by: STUDENT IN AN ORGANIZED HEALTH CARE EDUCATION/TRAINING PROGRAM

## 2025-02-11 PROCEDURE — 3074F SYST BP LT 130 MM HG: CPT | Performed by: STUDENT IN AN ORGANIZED HEALTH CARE EDUCATION/TRAINING PROGRAM

## 2025-02-11 PROCEDURE — 81002 URINALYSIS NONAUTO W/O SCOPE: CPT | Performed by: STUDENT IN AN ORGANIZED HEALTH CARE EDUCATION/TRAINING PROGRAM

## 2025-02-11 PROCEDURE — 99396 PREV VISIT EST AGE 40-64: CPT | Performed by: STUDENT IN AN ORGANIZED HEALTH CARE EDUCATION/TRAINING PROGRAM

## 2025-02-11 PROCEDURE — 99213 OFFICE O/P EST LOW 20 MIN: CPT | Performed by: STUDENT IN AN ORGANIZED HEALTH CARE EDUCATION/TRAINING PROGRAM

## 2025-02-11 PROCEDURE — 93000 ELECTROCARDIOGRAM COMPLETE: CPT | Performed by: STUDENT IN AN ORGANIZED HEALTH CARE EDUCATION/TRAINING PROGRAM

## 2025-02-11 PROCEDURE — 3008F BODY MASS INDEX DOCD: CPT | Performed by: STUDENT IN AN ORGANIZED HEALTH CARE EDUCATION/TRAINING PROGRAM

## 2025-02-11 RX ORDER — OLMESARTAN MEDOXOMIL 40 MG/1
40 TABLET ORAL DAILY
Qty: 90 TABLET | Refills: 1 | Status: SHIPPED | OUTPATIENT
Start: 2025-02-11

## 2025-02-11 ASSESSMENT — PATIENT HEALTH QUESTIONNAIRE - PHQ9
SUM OF ALL RESPONSES TO PHQ9 QUESTIONS 1 AND 2: 0
1. LITTLE INTEREST OR PLEASURE IN DOING THINGS: NOT AT ALL
2. FEELING DOWN, DEPRESSED OR HOPELESS: NOT AT ALL

## 2025-02-11 NOTE — PROGRESS NOTES
"Subjective   Patient ID: Chuy Oconnell \"Florentin\" is a 59 y.o. male who presents for Annual Exam.  Today he is accompanied by alone.     HPI    1. Health Maintenance  Overall patient is doing well.   Immunization:   -Tdap 2021    -Influenza vaccine declines   -Shingrix declines    Colon Cancer Screening: Positive Cologuard and performed colonoscopy this summer, 2023, with a 5-year clearance, due 2028  Diet: patient is on a specific diet plan and continues to do well  Exercise: 5-6 days a week. Involves weights, cycling and rowing machine.   Tobacco: Denies use  EtOH: 1-2 glasses of red wine a week    2. Hypertension  Currently on olmesartan 40 mg daily.  Continues to feel well without any issues/complaints  Denies any cardiopulmonary symptoms requesting refills     3.  Hyperlipidemia   Currently trying to eat a well balanced diet and exercises daily  Previous lipid panel in November 2023 with cholesterol 199, LDL at 134, HDL at 50 and triglycerides at 73  CT cardiac score was 0 in 07/2020  Continues to do blood work on a yearly basis     4.  Lung nodule on CT  A follow up CT was done in July 2024 which showed stable scattered pulmonary nodules measuring up to 6 mm with no new pulmonary nodules or mass.  States that he will debate to do this again and wish to discuss this briefly     5.  Prediabetes  Last A1c was stable at 6.1 in November 2023  Denies any polyuria, polydipsia, polyphagia, vision changes or neuropathy     Current Outpatient Medications on File Prior to Visit   Medication Sig Dispense Refill    olmesartan (BENIcar) 40 mg tablet TAKE 1 TABLET BY MOUTH EVERY DAY 15 tablet 0     No current facility-administered medications on file prior to visit.        No Known Allergies    Immunization History   Administered Date(s) Administered    COVID-19, mRNA, LNP-S, PF, 30 mcg/0.3 mL dose 04/05/2021, 04/26/2021    Moderna SARS-CoV-2 Vaccination 12/06/2021, 04/21/2022    Tdap vaccine, age 7 year and older (BOOSTRIX, " "ADACEL) 09/24/2021         Review of Systems  All pertinent positive symptoms are included in the history of present illness.  All other systems have been reviewed and are negative and noncontributory to this patient's current ailments.     Objective   /80 (BP Location: Left arm, Patient Position: Sitting, BP Cuff Size: Adult)   Pulse 86   Ht 1.778 m (5' 10\")   Wt 97.1 kg (214 lb)   SpO2 97%   BMI 30.71 kg/m²   BSA: 2.19 meters squared  No visits with results within 1 Month(s) from this visit.   Latest known visit with results is:   Lab on 11/16/2023   Component Date Value Ref Range Status    Prostate Specific AG 11/16/2023 0.73  <=4.00 ng/mL Final    Thyroid Stimulating Hormone 11/16/2023 0.94  0.44 - 3.98 mIU/L Final    Cholesterol 11/16/2023 199  0 - 199 mg/dL Final          Age      Desirable   Borderline High   High     0-19 Y     0 - 169       170 - 199     >/= 200    20-24 Y     0 - 189       190 - 224     >/= 225         >24 Y     0 - 199       200 - 239     >/= 240   **All ranges are based on fasting samples. Specific   therapeutic targets will vary based on patient-specific   cardiac risk.    Pediatric guidelines reference:Pediatrics 2011, 128(S5).Adult guidelines reference: NCEP ATPIII Guidelines,PATTY 2001, 258:2486-97    Venipuncture immediately after or during the administration of Metamizole may lead to falsely low results. Testing should be performed immediately prior to Metamizole dosing.    HDL-Cholesterol 11/16/2023 50.4  mg/dL Final      Age       Very Low   Low     Normal    High    0-19 Y    < 35      < 40     40-45     ----  20-24 Y    ----     < 40      >45      ----        >24 Y      ----     < 40     40-60      >60      Cholesterol/HDL Ratio 11/16/2023 3.9   Final      Ref Values  Desirable  < 3.4  High Risk  > 5.0    LDL Calculated 11/16/2023 134 (H)  <=99 mg/dL Final                                Near   Borderline      AGE      Desirable  Optimal    High     High     Very " High     0-19 Y     0 - 109     ---    110-129   >/= 130     ----    20-24 Y     0 - 119     ---    120-159   >/= 160     ----      >24 Y     0 -  99   100-129  130-159   160-189     >/=190      VLDL 11/16/2023 15  0 - 40 mg/dL Final    Triglycerides 11/16/2023 73  0 - 149 mg/dL Final       Age         Desirable   Borderline High   High     Very High   0 D-90 D    19 - 174         ----         ----        ----  91 D- 9 Y     0 -  74        75 -  99     >/= 100      ----    10-19 Y     0 -  89        90 - 129     >/= 130      ----    20-24 Y     0 - 114       115 - 149     >/= 150      ----         >24 Y     0 - 149       150 - 199    200- 499    >/= 500    Venipuncture immediately after or during the administration of Metamizole may lead to falsely low results. Testing should be performed immediately prior to Metamizole dosing.    Non HDL Cholesterol 11/16/2023 149  0 - 149 mg/dL Final          Age       Desirable   Borderline High   High     Very High     0-19 Y     0 - 119       120 - 144     >/= 145    >/= 160    20-24 Y     0 - 149       150 - 189     >/= 190      ----         >24 Y    30 mg/dL above LDL Cholesterol goal      Glucose 11/16/2023 100 (H)  74 - 99 mg/dL Final    Sodium 11/16/2023 137  136 - 145 mmol/L Final    Potassium 11/16/2023 4.5  3.5 - 5.3 mmol/L Final    Chloride 11/16/2023 103  98 - 107 mmol/L Final    Bicarbonate 11/16/2023 28  21 - 32 mmol/L Final    Anion Gap 11/16/2023 11  10 - 20 mmol/L Final    Urea Nitrogen 11/16/2023 22  6 - 23 mg/dL Final    Creatinine 11/16/2023 1.06  0.50 - 1.30 mg/dL Final    eGFR 11/16/2023 81  >60 mL/min/1.73m*2 Final    Calculations of estimated GFR are performed using the 2021 CKD-EPI Study Refit equation without the race variable for the IDMS-Traceable creatinine methods.  https://jasn.asnjournals.org/content/early/2021/09/22/ASN.1989368305    Calcium 11/16/2023 9.1  8.6 - 10.3 mg/dL Final    Albumin 11/16/2023 4.3  3.4 - 5.0 g/dL Final    Alkaline  Phosphatase 11/16/2023 45  33 - 120 U/L Final    Total Protein 11/16/2023 7.0  6.4 - 8.2 g/dL Final    AST 11/16/2023 21  9 - 39 U/L Final    Bilirubin, Total 11/16/2023 0.5  0.0 - 1.2 mg/dL Final    ALT 11/16/2023 25  10 - 52 U/L Final    Patients treated with Sulfasalazine may generate falsely decreased results for ALT.    WBC 11/16/2023 6.8  4.4 - 11.3 x10*3/uL Final    nRBC 11/16/2023 0.0  0.0 - 0.0 /100 WBCs Final    RBC 11/16/2023 5.12  4.50 - 5.90 x10*6/uL Final    Hemoglobin 11/16/2023 14.7  13.5 - 17.5 g/dL Final    Hematocrit 11/16/2023 46.5  41.0 - 52.0 % Final    MCV 11/16/2023 91  80 - 100 fL Final    MCH 11/16/2023 28.7  26.0 - 34.0 pg Final    MCHC 11/16/2023 31.6 (L)  32.0 - 36.0 g/dL Final    RDW 11/16/2023 13.9  11.5 - 14.5 % Final    Platelets 11/16/2023 295  150 - 450 x10*3/uL Final    Neutrophils % 11/16/2023 50.4  40.0 - 80.0 % Final    Immature Granulocytes %, Automated 11/16/2023 0.1  0.0 - 0.9 % Final    Immature Granulocyte Count (IG) includes promyelocytes, myelocytes and metamyelocytes but does not include bands. Percent differential counts (%) should be interpreted in the context of the absolute cell counts (cells/UL).    Lymphocytes % 11/16/2023 40.3  13.0 - 44.0 % Final    Monocytes % 11/16/2023 7.2  2.0 - 10.0 % Final    Eosinophils % 11/16/2023 1.6  0.0 - 6.0 % Final    Basophils % 11/16/2023 0.4  0.0 - 2.0 % Final    Neutrophils Absolute 11/16/2023 3.40  1.20 - 7.70 x10*3/uL Final    Percent differential counts (%) should be interpreted in the context of the absolute cell counts (cells/uL).    Immature Granulocytes Absolute, Au* 11/16/2023 0.01  0.00 - 0.70 x10*3/uL Final    Lymphocytes Absolute 11/16/2023 2.73  1.20 - 4.80 x10*3/uL Final    Monocytes Absolute 11/16/2023 0.49  0.10 - 1.00 x10*3/uL Final    Eosinophils Absolute 11/16/2023 0.11  0.00 - 0.70 x10*3/uL Final    Basophils Absolute 11/16/2023 0.03  0.00 - 0.10 x10*3/uL Final    Hemoglobin A1C 11/16/2023 6.1 (H)  see below  % Final    Estimated Average Glucose 11/16/2023 128  Not Established mg/dL Final       Physical Exam  CONSTITUTIONAL - well nourished, well developed, looks like stated age, in no acute distress, not ill-appearing, and not tired appearing  SKIN - normal skin color and pigmentation, normal skin turgor without rash, lesions, or nodules visualized  HEAD - no trauma, normocephalic  EYES - extraocular muscles are intact, and normal external exam  ENT - no injection, no signs of infection, uvula midline, normal tongue movement and throat normal, no exudate, nasal passage without discharge and patent  NECK - supple without rigidity, no neck mass was observed,   LUNG - clear to auscultation, no wheezing, no crackles and no rales, good effort  CARDIAC - regular rate and regular rhythm, no skipped beats, no murmur  ABDOMEN - no organomegaly, soft, nontender, nondistended, normal bowel sounds  EXTREMITIES - no edema, no deformities  NEUROLOGICAL - normal gait, normal balance, normal motor, alert, oriented and no focal signs  PSYCHIATRIC - alert, pleasant and cordial, age-appropriate      Assessment/Plan     1. Health maintenance  Overall patient is doing well.    Complete history and physical examination was performed   Lab work was ordered and will notify of test results and make recommendations accordingly  Colon Cancer screening due 2028  EKG shows normal sinus rhythm without any acute changes   Please attempt to eat a well-balanced diet and exercise regularly    2.  Hypertension  Stable.  No changes recommended at this time  We will continue olmesartan 40 mg daily  I would like to have you monitor and record blood pressures at home  Blood pressure goal should be below 130/80, ideally 120/80     3.  Hyperlipidemia   Previous cholesterol panel did show a slightly elevated LDL as discussed at today's appointment  CT cardiac score was 0 in July 2020, a requisition for a CT cardiac score has been placed for July 2025.  Continue  taking over-the-counter fish oil  Will continue monitoring values at your physical, requisition placed     4.  Lung nodule on CT  Repeat CT chest showed stability of lung nodules.  Will reconsider repeat imaging if you become symptomatic.     5.  Prediabetes  Last A1c was noted to be stable at 6.1%  We will continue monitoring closely and please exercise regularly  Will repeat this and notify you of results and treatment recommendations accordingly.    We will follow-up in 6 months for medication refills.

## 2025-02-12 LAB
ALBUMIN SERPL-MCNC: 4.7 G/DL (ref 3.6–5.1)
ALP SERPL-CCNC: 66 U/L (ref 35–144)
ALT SERPL-CCNC: 31 U/L (ref 9–46)
AST SERPL-CCNC: 20 U/L (ref 10–35)
BASOPHILS # BLD AUTO: 18 CELLS/UL (ref 0–200)
BASOPHILS NFR BLD AUTO: 0.3 %
BILIRUB SERPL-MCNC: 0.3 MG/DL (ref 0.2–1.2)
BUN SERPL-MCNC: 24 MG/DL (ref 7–25)
CALCIUM SERPL-MCNC: 9.7 MG/DL (ref 8.6–10.3)
CHLORIDE SERPL-SCNC: 106 MMOL/L (ref 98–110)
CHOLEST SERPL-MCNC: 176 MG/DL
CHOLEST/HDLC SERPL: 4.5 (CALC)
CO2 SERPL-SCNC: ABNORMAL MMOL/L
CREAT SERPL-MCNC: 1.13 MG/DL (ref 0.7–1.35)
EGFRCR SERPLBLD CKD-EPI 2021: 74 ML/MIN/1.73M2
EOSINOPHIL # BLD AUTO: 232 CELLS/UL (ref 15–500)
EOSINOPHIL NFR BLD AUTO: 3.8 %
ERYTHROCYTE [DISTWIDTH] IN BLOOD BY AUTOMATED COUNT: 12.9 % (ref 11–15)
EST. AVERAGE GLUCOSE BLD GHB EST-MCNC: 134 MG/DL
EST. AVERAGE GLUCOSE BLD GHB EST-SCNC: 7.4 MMOL/L
GLUCOSE SERPL-MCNC: 121 MG/DL (ref 65–99)
HBA1C MFR BLD: 6.3 % OF TOTAL HGB
HCT VFR BLD AUTO: 49 % (ref 38.5–50)
HDLC SERPL-MCNC: 39 MG/DL
HGB BLD-MCNC: 15.7 G/DL (ref 13.2–17.1)
LDLC SERPL CALC-MCNC: 103 MG/DL (CALC)
LYMPHOCYTES # BLD AUTO: 2269 CELLS/UL (ref 850–3900)
LYMPHOCYTES NFR BLD AUTO: 37.2 %
MCH RBC QN AUTO: 28.6 PG (ref 27–33)
MCHC RBC AUTO-ENTMCNC: 32 G/DL (ref 32–36)
MCV RBC AUTO: 89.4 FL (ref 80–100)
MONOCYTES # BLD AUTO: 592 CELLS/UL (ref 200–950)
MONOCYTES NFR BLD AUTO: 9.7 %
NEUTROPHILS # BLD AUTO: 2989 CELLS/UL (ref 1500–7800)
NEUTROPHILS NFR BLD AUTO: 49 %
NONHDLC SERPL-MCNC: 137 MG/DL (CALC)
PLATELET # BLD AUTO: 263 THOUSAND/UL (ref 140–400)
PMV BLD REES-ECKER: 10.7 FL (ref 7.5–12.5)
POTASSIUM SERPL-SCNC: 5.6 MMOL/L (ref 3.5–5.3)
PROT SERPL-MCNC: 7.8 G/DL (ref 6.1–8.1)
PSA SERPL-MCNC: 0.6 NG/ML
RBC # BLD AUTO: 5.48 MILLION/UL (ref 4.2–5.8)
SODIUM SERPL-SCNC: 144 MMOL/L (ref 135–146)
TRIGL SERPL-MCNC: 222 MG/DL
TSH SERPL-ACNC: 1.26 MIU/L (ref 0.4–4.5)
WBC # BLD AUTO: 6.1 THOUSAND/UL (ref 3.8–10.8)

## 2025-02-12 NOTE — RESULT ENCOUNTER NOTE
Hemoglobin A1c still shows prediabetes at 6.3% so we will continue monitoring closely    Prostate within normal limits alongside a normal thyroid    Complete blood cell count shows no anemia    We are just waiting for the other labs at this time to result

## 2025-02-13 DIAGNOSIS — E87.5 HYPERKALEMIA: Primary | ICD-10-CM

## 2025-02-13 NOTE — RESULT ENCOUNTER NOTE
Cholesterol looks good at 176, HDL 39, , triglycerides 222  If not already doing so fish oil would be an option to continue helping    Sugar slightly elevated at 121 but otherwise liver, kidneys, electrolytes within normal limits  Potassium is slightly elevated at 5.6 so I would recommend that he repeats lab work within the next week just to make sure that this value is not truly elevated

## 2025-03-18 ENCOUNTER — HOSPITAL ENCOUNTER (OUTPATIENT)
Dept: RADIOLOGY | Facility: CLINIC | Age: 60
Discharge: HOME | End: 2025-03-18
Payer: COMMERCIAL

## 2025-03-18 DIAGNOSIS — E78.00 ELEVATED LDL CHOLESTEROL LEVEL: ICD-10-CM

## 2025-03-18 PROCEDURE — 75571 CT HRT W/O DYE W/CA TEST: CPT

## 2025-03-19 NOTE — RESULT ENCOUNTER NOTE
CT cardiac score shows a value of 1.33 which is a very low value    There is a stable and mild thoracic aortic dilatation that is at 4.0 cm in diameter  No changes noted and no further imaging is recommended  There is a benign appearing subcentimeter lung nodule that is unchanged again when compared to the 2020 study    No further imaging is recommended to be followed up

## 2025-08-06 DIAGNOSIS — I10 PRIMARY HYPERTENSION: ICD-10-CM

## 2025-08-06 RX ORDER — OLMESARTAN MEDOXOMIL 40 MG/1
40 TABLET ORAL DAILY
Qty: 90 TABLET | Refills: 0 | Status: SHIPPED | OUTPATIENT
Start: 2025-08-06